# Patient Record
Sex: FEMALE | Race: WHITE | Employment: FULL TIME | URBAN - METROPOLITAN AREA
[De-identification: names, ages, dates, MRNs, and addresses within clinical notes are randomized per-mention and may not be internally consistent; named-entity substitution may affect disease eponyms.]

---

## 2022-12-20 ENCOUNTER — OFFICE VISIT (OUTPATIENT)
Dept: FAMILY MEDICINE CLINIC | Facility: CLINIC | Age: 47
End: 2022-12-20
Payer: COMMERCIAL

## 2022-12-20 ENCOUNTER — LAB ENCOUNTER (OUTPATIENT)
Dept: LAB | Age: 47
End: 2022-12-20
Attending: FAMILY MEDICINE
Payer: COMMERCIAL

## 2022-12-20 VITALS
BODY MASS INDEX: 25.97 KG/M2 | WEIGHT: 154 LBS | RESPIRATION RATE: 16 BRPM | OXYGEN SATURATION: 98 % | SYSTOLIC BLOOD PRESSURE: 104 MMHG | HEART RATE: 68 BPM | HEIGHT: 64.5 IN | DIASTOLIC BLOOD PRESSURE: 64 MMHG

## 2022-12-20 DIAGNOSIS — Z00.00 ANNUAL PHYSICAL EXAM: Primary | ICD-10-CM

## 2022-12-20 DIAGNOSIS — Z00.00 ANNUAL PHYSICAL EXAM: ICD-10-CM

## 2022-12-20 DIAGNOSIS — F41.9 ANXIETY AND DEPRESSION: ICD-10-CM

## 2022-12-20 DIAGNOSIS — Z12.11 COLON CANCER SCREENING: ICD-10-CM

## 2022-12-20 DIAGNOSIS — F32.A ANXIETY AND DEPRESSION: ICD-10-CM

## 2022-12-20 DIAGNOSIS — Z12.31 ENCOUNTER FOR SCREENING MAMMOGRAM FOR HIGH-RISK PATIENT: ICD-10-CM

## 2022-12-20 DIAGNOSIS — F32.81 PMDD (PREMENSTRUAL DYSPHORIC DISORDER): ICD-10-CM

## 2022-12-20 LAB
ALBUMIN SERPL-MCNC: 4.1 G/DL (ref 3.4–5)
ALBUMIN/GLOB SERPL: 1.1 {RATIO} (ref 1–2)
ALP LIVER SERPL-CCNC: 32 U/L
ALT SERPL-CCNC: 20 U/L
ANION GAP SERPL CALC-SCNC: 5 MMOL/L (ref 0–18)
AST SERPL-CCNC: 18 U/L (ref 15–37)
BASOPHILS # BLD AUTO: 0.07 X10(3) UL (ref 0–0.2)
BASOPHILS NFR BLD AUTO: 1.5 %
BILIRUB SERPL-MCNC: 0.5 MG/DL (ref 0.1–2)
BUN BLD-MCNC: 9 MG/DL (ref 7–18)
CALCIUM BLD-MCNC: 9.4 MG/DL (ref 8.5–10.1)
CHLORIDE SERPL-SCNC: 105 MMOL/L (ref 98–112)
CHOLEST SERPL-MCNC: 221 MG/DL (ref ?–200)
CO2 SERPL-SCNC: 28 MMOL/L (ref 21–32)
CREAT BLD-MCNC: 0.71 MG/DL
EOSINOPHIL # BLD AUTO: 0.05 X10(3) UL (ref 0–0.7)
EOSINOPHIL NFR BLD AUTO: 1.1 %
ERYTHROCYTE [DISTWIDTH] IN BLOOD BY AUTOMATED COUNT: 13 %
ESTRADIOL SERPL-MCNC: 72.4 PG/ML
FASTING PATIENT LIPID ANSWER: YES
FASTING STATUS PATIENT QL REPORTED: YES
GFR SERPLBLD BASED ON 1.73 SQ M-ARVRAT: 107 ML/MIN/1.73M2 (ref 60–?)
GLOBULIN PLAS-MCNC: 3.6 G/DL (ref 2.8–4.4)
GLUCOSE BLD-MCNC: 86 MG/DL (ref 70–99)
HCT VFR BLD AUTO: 41.2 %
HDLC SERPL-MCNC: 78 MG/DL (ref 40–59)
HGB BLD-MCNC: 13.3 G/DL
IGA SERPL-MCNC: 298 MG/DL (ref 70–312)
IMM GRANULOCYTES # BLD AUTO: 0 X10(3) UL (ref 0–1)
IMM GRANULOCYTES NFR BLD: 0 %
LDLC SERPL CALC-MCNC: 137 MG/DL (ref ?–100)
LYMPHOCYTES # BLD AUTO: 1.95 X10(3) UL (ref 1–4)
LYMPHOCYTES NFR BLD AUTO: 41 %
MCH RBC QN AUTO: 31.1 PG (ref 26–34)
MCHC RBC AUTO-ENTMCNC: 32.3 G/DL (ref 31–37)
MCV RBC AUTO: 96.3 FL
MONOCYTES # BLD AUTO: 0.43 X10(3) UL (ref 0.1–1)
MONOCYTES NFR BLD AUTO: 9 %
NEUTROPHILS # BLD AUTO: 2.26 X10 (3) UL (ref 1.5–7.7)
NEUTROPHILS # BLD AUTO: 2.26 X10(3) UL (ref 1.5–7.7)
NEUTROPHILS NFR BLD AUTO: 47.4 %
NONHDLC SERPL-MCNC: 143 MG/DL (ref ?–130)
OSMOLALITY SERPL CALC.SUM OF ELEC: 284 MOSM/KG (ref 275–295)
PLATELET # BLD AUTO: 311 10(3)UL (ref 150–450)
POTASSIUM SERPL-SCNC: 4.3 MMOL/L (ref 3.5–5.1)
PROGEST SERPL-MCNC: 0.72 NG/ML
PROT SERPL-MCNC: 7.7 G/DL (ref 6.4–8.2)
RBC # BLD AUTO: 4.28 X10(6)UL
SODIUM SERPL-SCNC: 138 MMOL/L (ref 136–145)
T4 FREE SERPL-MCNC: 1 NG/DL (ref 0.8–1.7)
TRIGL SERPL-MCNC: 34 MG/DL (ref 30–149)
TSI SER-ACNC: 2.99 MIU/ML (ref 0.36–3.74)
VIT B12 SERPL-MCNC: 604 PG/ML (ref 193–986)
VIT D+METAB SERPL-MCNC: 26.7 NG/ML (ref 30–100)
VLDLC SERPL CALC-MCNC: 6 MG/DL (ref 0–30)
WBC # BLD AUTO: 4.8 X10(3) UL (ref 4–11)

## 2022-12-20 PROCEDURE — 84403 ASSAY OF TOTAL TESTOSTERONE: CPT

## 2022-12-20 PROCEDURE — 86364 TISS TRNSGLTMNASE EA IG CLAS: CPT

## 2022-12-20 PROCEDURE — 3008F BODY MASS INDEX DOCD: CPT | Performed by: FAMILY MEDICINE

## 2022-12-20 PROCEDURE — 84439 ASSAY OF FREE THYROXINE: CPT

## 2022-12-20 PROCEDURE — 82670 ASSAY OF TOTAL ESTRADIOL: CPT

## 2022-12-20 PROCEDURE — 3074F SYST BP LT 130 MM HG: CPT | Performed by: FAMILY MEDICINE

## 2022-12-20 PROCEDURE — 80061 LIPID PANEL: CPT

## 2022-12-20 PROCEDURE — 85025 COMPLETE CBC W/AUTO DIFF WBC: CPT

## 2022-12-20 PROCEDURE — 81382 HLA II TYPING 1 LOC HR: CPT

## 2022-12-20 PROCEDURE — 84402 ASSAY OF FREE TESTOSTERONE: CPT

## 2022-12-20 PROCEDURE — 80053 COMPREHEN METABOLIC PANEL: CPT

## 2022-12-20 PROCEDURE — 84443 ASSAY THYROID STIM HORMONE: CPT

## 2022-12-20 PROCEDURE — 84144 ASSAY OF PROGESTERONE: CPT

## 2022-12-20 PROCEDURE — 36415 COLL VENOUS BLD VENIPUNCTURE: CPT

## 2022-12-20 PROCEDURE — 82306 VITAMIN D 25 HYDROXY: CPT

## 2022-12-20 PROCEDURE — 82607 VITAMIN B-12: CPT

## 2022-12-20 PROCEDURE — 99386 PREV VISIT NEW AGE 40-64: CPT | Performed by: FAMILY MEDICINE

## 2022-12-20 PROCEDURE — 3078F DIAST BP <80 MM HG: CPT | Performed by: FAMILY MEDICINE

## 2022-12-20 PROCEDURE — 99213 OFFICE O/P EST LOW 20 MIN: CPT | Performed by: FAMILY MEDICINE

## 2022-12-23 DIAGNOSIS — E78.00 ELEVATED CHOLESTEROL: Primary | ICD-10-CM

## 2022-12-23 DIAGNOSIS — E55.9 VITAMIN D DEFICIENCY: ICD-10-CM

## 2022-12-23 LAB — TTG IGA SER-ACNC: 0.6 U/ML (ref ?–7)

## 2022-12-28 LAB
SEX HORMONE BINDING GLOBULIN: 64 NMOL/L
TESTOSTERONE -MS, BIOAVAILAB: 17.1 NG/DL
TESTOSTERONE, -MS/MS: 54 NG/DL
TESTOSTERONE, FREE -MS/MS: 5.9 PG/ML

## 2023-01-03 ENCOUNTER — PATIENT MESSAGE (OUTPATIENT)
Dept: FAMILY MEDICINE CLINIC | Facility: CLINIC | Age: 48
End: 2023-01-03

## 2023-01-03 NOTE — TELEPHONE ENCOUNTER
From: Janis Bobo  To: Konrad Martino DO  Sent: 1/3/2023 7:04 AM CST  Subject: Referral for chiropractor    I just saw Dr Leonie Tolentino and forgot to get a referral for a chiropractor. Sam never seen one before but would like an evaluation due to chronic neck pain. Please tell me next steps, thank you.  Nina Daley

## 2023-01-04 ENCOUNTER — TELEPHONE (OUTPATIENT)
Dept: ORTHOPEDICS CLINIC | Facility: CLINIC | Age: 48
End: 2023-01-04

## 2023-01-04 DIAGNOSIS — M54.50 LOW BACK PAIN, UNSPECIFIED BACK PAIN LATERALITY, UNSPECIFIED CHRONICITY, UNSPECIFIED WHETHER SCIATICA PRESENT: ICD-10-CM

## 2023-01-04 DIAGNOSIS — M54.2 NECK PAIN: Primary | ICD-10-CM

## 2023-01-04 NOTE — TELEPHONE ENCOUNTER
NP Neck and lower back pain. Please advise if imaging is needed.   Future Appointments   Date Time Provider Kalie Marge   1/10/2023 10:00 AM Srinivasan Downey MD [facility]THO [facility] [id number]   1/25/2023  9:00 AM [facility] RM1 PFS MAMMO [facility]   5/9/2023  2:30 PM Salazar Atkinson MD [facility] OB[facility]GYN P [facility] [address]

## 2023-01-10 ENCOUNTER — OFFICE VISIT (OUTPATIENT)
Dept: ORTHOPEDICS CLINIC | Facility: CLINIC | Age: 48
End: 2023-01-10
Payer: COMMERCIAL

## 2023-01-10 ENCOUNTER — HOSPITAL ENCOUNTER (OUTPATIENT)
Dept: GENERAL RADIOLOGY | Age: 48
Discharge: HOME | End: 2023-01-10
Attending: STUDENT IN AN ORGANIZED HEALTH CARE EDUCATION/TRAINING PROGRAM
Payer: COMMERCIAL

## 2023-01-10 VITALS — WEIGHT: 152 LBS | BODY MASS INDEX: 25.64 KG/M2 | HEIGHT: 64.5 IN

## 2023-01-10 DIAGNOSIS — M54.2 NECK PAIN: ICD-10-CM

## 2023-01-10 DIAGNOSIS — G89.29 CHRONIC MIDLINE LOW BACK PAIN WITHOUT SCIATICA: Primary | ICD-10-CM

## 2023-01-10 DIAGNOSIS — M54.50 LOW BACK PAIN, UNSPECIFIED BACK PAIN LATERALITY, UNSPECIFIED CHRONICITY, UNSPECIFIED WHETHER SCIATICA PRESENT: ICD-10-CM

## 2023-01-10 DIAGNOSIS — M54.50 CHRONIC MIDLINE LOW BACK PAIN WITHOUT SCIATICA: Primary | ICD-10-CM

## 2023-01-10 PROCEDURE — 72100 X-RAY EXAM L-S SPINE 2/3 VWS: CPT | Performed by: STUDENT IN AN ORGANIZED HEALTH CARE EDUCATION/TRAINING PROGRAM

## 2023-01-10 PROCEDURE — 72050 X-RAY EXAM NECK SPINE 4/5VWS: CPT | Performed by: STUDENT IN AN ORGANIZED HEALTH CARE EDUCATION/TRAINING PROGRAM

## 2023-01-10 PROCEDURE — 3008F BODY MASS INDEX DOCD: CPT | Performed by: STUDENT IN AN ORGANIZED HEALTH CARE EDUCATION/TRAINING PROGRAM

## 2023-01-10 PROCEDURE — 99204 OFFICE O/P NEW MOD 45 MIN: CPT | Performed by: STUDENT IN AN ORGANIZED HEALTH CARE EDUCATION/TRAINING PROGRAM

## 2023-01-25 ENCOUNTER — HOSPITAL ENCOUNTER (OUTPATIENT)
Dept: MAMMOGRAPHY | Age: 48
Discharge: HOME | End: 2023-01-25
Attending: FAMILY MEDICINE
Payer: COMMERCIAL

## 2023-01-25 DIAGNOSIS — Z12.31 ENCOUNTER FOR SCREENING MAMMOGRAM FOR HIGH-RISK PATIENT: ICD-10-CM

## 2023-01-25 PROCEDURE — 77063 BREAST TOMOSYNTHESIS BI: CPT | Performed by: FAMILY MEDICINE

## 2023-01-25 PROCEDURE — 77067 SCR MAMMO BI INCL CAD: CPT | Performed by: FAMILY MEDICINE

## 2023-01-26 DIAGNOSIS — R92.2 DENSE BREAST: Primary | ICD-10-CM

## 2023-03-02 ENCOUNTER — PATIENT MESSAGE (OUTPATIENT)
Dept: FAMILY MEDICINE CLINIC | Facility: CLINIC | Age: 48
End: 2023-03-02

## 2023-03-28 ENCOUNTER — OFFICE VISIT (OUTPATIENT)
Dept: OBGYN CLINIC | Facility: CLINIC | Age: 48
End: 2023-03-28

## 2023-03-28 VITALS
HEIGHT: 64.5 IN | WEIGHT: 155.31 LBS | DIASTOLIC BLOOD PRESSURE: 76 MMHG | BODY MASS INDEX: 26.19 KG/M2 | SYSTOLIC BLOOD PRESSURE: 118 MMHG

## 2023-03-28 DIAGNOSIS — N92.6 IRREGULAR MENSES: Primary | ICD-10-CM

## 2023-03-28 PROCEDURE — 3078F DIAST BP <80 MM HG: CPT | Performed by: OBSTETRICS & GYNECOLOGY

## 2023-03-28 PROCEDURE — 3074F SYST BP LT 130 MM HG: CPT | Performed by: OBSTETRICS & GYNECOLOGY

## 2023-03-28 PROCEDURE — 3008F BODY MASS INDEX DOCD: CPT | Performed by: OBSTETRICS & GYNECOLOGY

## 2023-03-28 PROCEDURE — 99204 OFFICE O/P NEW MOD 45 MIN: CPT | Performed by: OBSTETRICS & GYNECOLOGY

## 2023-04-10 ENCOUNTER — LAB ENCOUNTER (OUTPATIENT)
Dept: LAB | Age: 48
End: 2023-04-10
Attending: OBSTETRICS & GYNECOLOGY
Payer: COMMERCIAL

## 2023-04-10 DIAGNOSIS — E55.9 VITAMIN D DEFICIENCY: ICD-10-CM

## 2023-04-10 DIAGNOSIS — N92.6 IRREGULAR MENSES: ICD-10-CM

## 2023-04-10 DIAGNOSIS — E78.00 ELEVATED CHOLESTEROL: ICD-10-CM

## 2023-04-10 LAB
ALBUMIN SERPL-MCNC: 3.9 G/DL (ref 3.4–5)
ALBUMIN/GLOB SERPL: 1.1 {RATIO} (ref 1–2)
ALP LIVER SERPL-CCNC: 27 U/L
ALT SERPL-CCNC: 24 U/L
ANION GAP SERPL CALC-SCNC: 6 MMOL/L (ref 0–18)
AST SERPL-CCNC: 18 U/L (ref 15–37)
BILIRUB SERPL-MCNC: 0.5 MG/DL (ref 0.1–2)
BUN BLD-MCNC: 10 MG/DL (ref 7–18)
CALCIUM BLD-MCNC: 9 MG/DL (ref 8.5–10.1)
CHLORIDE SERPL-SCNC: 107 MMOL/L (ref 98–112)
CHOLEST SERPL-MCNC: 198 MG/DL (ref ?–200)
CO2 SERPL-SCNC: 27 MMOL/L (ref 21–32)
CREAT BLD-MCNC: 0.79 MG/DL
DHEA-S SERPL-MCNC: 108.3 UG/DL
EST. AVERAGE GLUCOSE BLD GHB EST-MCNC: 108 MG/DL (ref 68–126)
ESTRADIOL SERPL-MCNC: 61.5 PG/ML
FASTING PATIENT LIPID ANSWER: YES
FASTING STATUS PATIENT QL REPORTED: YES
FSH SERPL-ACNC: 3.7 MIU/ML
GFR SERPLBLD BASED ON 1.73 SQ M-ARVRAT: 94 ML/MIN/1.73M2 (ref 60–?)
GLOBULIN PLAS-MCNC: 3.7 G/DL (ref 2.8–4.4)
GLUCOSE BLD-MCNC: 88 MG/DL (ref 70–99)
HBA1C MFR BLD: 5.4 % (ref ?–5.7)
HDLC SERPL-MCNC: 69 MG/DL (ref 40–59)
INSULIN SERPL-ACNC: 6.4 MU/L (ref 3–25)
LDLC SERPL CALC-MCNC: 121 MG/DL (ref ?–100)
LH SERPL-ACNC: 3.1 MIU/ML
NONHDLC SERPL-MCNC: 129 MG/DL (ref ?–130)
OSMOLALITY SERPL CALC.SUM OF ELEC: 288 MOSM/KG (ref 275–295)
POTASSIUM SERPL-SCNC: 4 MMOL/L (ref 3.5–5.1)
PROLACTIN SERPL-MCNC: 9.5 NG/ML
PROT SERPL-MCNC: 7.6 G/DL (ref 6.4–8.2)
SODIUM SERPL-SCNC: 140 MMOL/L (ref 136–145)
TRIGL SERPL-MCNC: 43 MG/DL (ref 30–149)
VLDLC SERPL CALC-MCNC: 8 MG/DL (ref 0–30)

## 2023-04-10 PROCEDURE — 80061 LIPID PANEL: CPT

## 2023-04-10 PROCEDURE — 83525 ASSAY OF INSULIN: CPT

## 2023-04-10 PROCEDURE — 83001 ASSAY OF GONADOTROPIN (FSH): CPT

## 2023-04-10 PROCEDURE — 82627 DEHYDROEPIANDROSTERONE: CPT

## 2023-04-10 PROCEDURE — 84146 ASSAY OF PROLACTIN: CPT

## 2023-04-10 PROCEDURE — 80053 COMPREHEN METABOLIC PANEL: CPT

## 2023-04-10 PROCEDURE — 36415 COLL VENOUS BLD VENIPUNCTURE: CPT

## 2023-04-10 PROCEDURE — 82670 ASSAY OF TOTAL ESTRADIOL: CPT

## 2023-04-10 PROCEDURE — 82306 VITAMIN D 25 HYDROXY: CPT

## 2023-04-10 PROCEDURE — 83036 HEMOGLOBIN GLYCOSYLATED A1C: CPT

## 2023-04-10 PROCEDURE — 84410 TESTOSTERONE BIOAVAILABLE: CPT

## 2023-04-10 PROCEDURE — 83002 ASSAY OF GONADOTROPIN (LH): CPT

## 2023-04-11 ENCOUNTER — PATIENT MESSAGE (OUTPATIENT)
Dept: FAMILY MEDICINE CLINIC | Facility: CLINIC | Age: 48
End: 2023-04-11

## 2023-04-11 LAB — VIT D+METAB SERPL-MCNC: 66 NG/ML (ref 30–100)

## 2023-04-13 ENCOUNTER — TELEPHONE (OUTPATIENT)
Dept: OBGYN CLINIC | Facility: CLINIC | Age: 48
End: 2023-04-13

## 2023-04-18 LAB
SEX HORM BIND GLOB: 54.5 NMOL/L
TESTOST % FREE+WEAK BND: 9.7 %
TESTOST FREE+WEAK BND: 2.4 NG/DL
TESTOSTERONE TOT /MS: 24.6 NG/DL

## 2023-04-25 ENCOUNTER — OFFICE VISIT (OUTPATIENT)
Dept: OBGYN CLINIC | Facility: CLINIC | Age: 48
End: 2023-04-25

## 2023-04-25 VITALS — HEIGHT: 64.5 IN | BODY MASS INDEX: 26.55 KG/M2 | WEIGHT: 157.44 LBS

## 2023-04-25 DIAGNOSIS — N95.1 PERIMENOPAUSE: Primary | ICD-10-CM

## 2023-04-25 RX ORDER — DROSPIRENONE AND ETHINYL ESTRADIOL 0.02-3(28)
1 KIT ORAL DAILY
Qty: 84 TABLET | Refills: 1 | Status: SHIPPED | OUTPATIENT
Start: 2023-04-25 | End: 2024-04-24

## 2023-05-15 ENCOUNTER — HOSPITAL ENCOUNTER (OUTPATIENT)
Dept: ULTRASOUND IMAGING | Age: 48
Discharge: HOME | End: 2023-05-15
Attending: FAMILY MEDICINE
Payer: COMMERCIAL

## 2023-05-15 DIAGNOSIS — R92.2 DENSE BREAST: ICD-10-CM

## 2023-05-15 PROCEDURE — 76641 ULTRASOUND BREAST COMPLETE: CPT | Performed by: FAMILY MEDICINE

## 2023-10-13 RX ORDER — DROSPIRENONE AND ETHINYL ESTRADIOL 0.02-3(28)
1 KIT ORAL DAILY
Qty: 84 TABLET | Refills: 1 | OUTPATIENT
Start: 2023-10-13 | End: 2024-10-12

## 2023-10-24 ENCOUNTER — OFFICE VISIT (OUTPATIENT)
Dept: OBGYN CLINIC | Facility: CLINIC | Age: 48
End: 2023-10-24

## 2023-10-24 VITALS
HEIGHT: 64.5 IN | SYSTOLIC BLOOD PRESSURE: 102 MMHG | BODY MASS INDEX: 26.56 KG/M2 | DIASTOLIC BLOOD PRESSURE: 68 MMHG | WEIGHT: 157.5 LBS

## 2023-10-24 DIAGNOSIS — N95.1 PERIMENOPAUSE: ICD-10-CM

## 2023-10-24 DIAGNOSIS — Z28.21 INFLUENZA VACCINATION DECLINED BY PATIENT: Primary | ICD-10-CM

## 2023-10-24 PROCEDURE — 3008F BODY MASS INDEX DOCD: CPT | Performed by: OBSTETRICS & GYNECOLOGY

## 2023-10-24 PROCEDURE — 3074F SYST BP LT 130 MM HG: CPT | Performed by: OBSTETRICS & GYNECOLOGY

## 2023-10-24 PROCEDURE — 99213 OFFICE O/P EST LOW 20 MIN: CPT | Performed by: OBSTETRICS & GYNECOLOGY

## 2023-10-24 PROCEDURE — 3078F DIAST BP <80 MM HG: CPT | Performed by: OBSTETRICS & GYNECOLOGY

## 2023-10-24 RX ORDER — DROSPIRENONE AND ETHINYL ESTRADIOL 0.03MG-3MG
1 KIT ORAL DAILY
Qty: 84 TABLET | Refills: 4 | Status: SHIPPED | OUTPATIENT
Start: 2023-10-24

## 2023-11-15 ENCOUNTER — PATIENT MESSAGE (OUTPATIENT)
Dept: OBGYN CLINIC | Facility: CLINIC | Age: 48
End: 2023-11-15

## 2023-11-17 RX ORDER — DROSPIRENONE AND ETHINYL ESTRADIOL 0.02-3(28)
1 KIT ORAL DAILY
Qty: 84 TABLET | Refills: 1 | Status: SHIPPED | OUTPATIENT
Start: 2023-11-17 | End: 2024-11-16

## 2023-11-17 RX ORDER — DROSPIRENONE AND ETHINYL ESTRADIOL 0.03MG-3MG
1 KIT ORAL DAILY
Qty: 84 TABLET | Refills: 4 | Status: SHIPPED | OUTPATIENT
Start: 2023-11-17 | End: 2023-11-17 | Stop reason: CLARIF

## 2023-12-30 ENCOUNTER — E-VISIT (OUTPATIENT)
Dept: TELEHEALTH | Age: 48
End: 2023-12-30
Payer: COMMERCIAL

## 2023-12-30 DIAGNOSIS — R68.89 FLU-LIKE SYMPTOMS: Primary | ICD-10-CM

## 2023-12-30 PROCEDURE — 99422 OL DIG E/M SVC 11-20 MIN: CPT | Performed by: NURSE PRACTITIONER

## 2023-12-30 RX ORDER — OSELTAMIVIR PHOSPHATE 75 MG/1
75 CAPSULE ORAL 2 TIMES DAILY
Qty: 10 CAPSULE | Refills: 0 | Status: SHIPPED | OUTPATIENT
Start: 2023-12-30 | End: 2024-01-04

## 2024-01-02 NOTE — PROGRESS NOTES
Refer to patient Questionnaire and responses.  See MyChart messages.    15 minutes spent in direct communication with patient via E-visit.

## 2024-01-14 ENCOUNTER — PATIENT MESSAGE (OUTPATIENT)
Dept: OBGYN CLINIC | Facility: CLINIC | Age: 49
End: 2024-01-14

## 2024-01-15 RX ORDER — METRONIDAZOLE 500 MG/1
500 TABLET ORAL 2 TIMES DAILY
Qty: 14 TABLET | Refills: 0 | Status: SHIPPED | OUTPATIENT
Start: 2024-01-15

## 2024-01-15 NOTE — TELEPHONE ENCOUNTER
From: Carol Krueger  To: Jaye Mcmahan  Sent: 1/14/2024 8:05 PM CST  Subject: Bacterial infection    Hello. I’m reaching out to get an antibiotic for a bacterial vaginosis infection. I’ve had an awful odor the past 4 months. It comes and goes, but is very prevalent after sex. I was very intentional about bathing more but it didn’t help. I took an at-home vaginal ph test several times and it’s a 5.0, which indicates abnormal and matches their description of a bacterial vaginosis infection. I have taken a few boric acid suppositories and that seems to help with the odor (and the ph goes to 4.5) until the next time I have sex. I only have an odor… no pain, no itching, no burning. After 4 months of this, I think it’s just never been gone.     In addition, I’ve been bleeding the past 3 weeks. The month before was for 2 weeks. I’m on birth control, but the bleeding happens while I’m taking the pills (not during the placebo pills). Dr Mcmahan already said that is breakthrough bleeding that tends to happen on the pill, so I’m not overly concerned. I just wanted to mention it.    I’m scheduled for a physical with Dr Mcmahan in March that I will attend, but if I can please get an antibiotic for this infection/odor, I’d really appreciate it. Thanks Carol

## 2024-03-29 ENCOUNTER — OFFICE VISIT (OUTPATIENT)
Dept: OBGYN CLINIC | Facility: CLINIC | Age: 49
End: 2024-03-29
Payer: COMMERCIAL

## 2024-03-29 VITALS
HEIGHT: 64 IN | DIASTOLIC BLOOD PRESSURE: 70 MMHG | SYSTOLIC BLOOD PRESSURE: 106 MMHG | HEART RATE: 74 BPM | WEIGHT: 154.5 LBS | BODY MASS INDEX: 26.38 KG/M2

## 2024-03-29 DIAGNOSIS — Z12.4 CERVICAL CANCER SCREENING: ICD-10-CM

## 2024-03-29 DIAGNOSIS — Z12.31 ENCOUNTER FOR SCREENING MAMMOGRAM FOR MALIGNANT NEOPLASM OF BREAST: Primary | ICD-10-CM

## 2024-03-29 DIAGNOSIS — L65.9 HAIR LOSS: ICD-10-CM

## 2024-03-29 DIAGNOSIS — Z01.419 WELL WOMAN EXAM WITH ROUTINE GYNECOLOGICAL EXAM: ICD-10-CM

## 2024-03-29 PROCEDURE — 87624 HPV HI-RISK TYP POOLED RSLT: CPT | Performed by: STUDENT IN AN ORGANIZED HEALTH CARE EDUCATION/TRAINING PROGRAM

## 2024-03-29 PROCEDURE — 3078F DIAST BP <80 MM HG: CPT | Performed by: STUDENT IN AN ORGANIZED HEALTH CARE EDUCATION/TRAINING PROGRAM

## 2024-03-29 PROCEDURE — 3074F SYST BP LT 130 MM HG: CPT | Performed by: STUDENT IN AN ORGANIZED HEALTH CARE EDUCATION/TRAINING PROGRAM

## 2024-03-29 PROCEDURE — 88175 CYTOPATH C/V AUTO FLUID REDO: CPT | Performed by: STUDENT IN AN ORGANIZED HEALTH CARE EDUCATION/TRAINING PROGRAM

## 2024-03-29 PROCEDURE — 99386 PREV VISIT NEW AGE 40-64: CPT | Performed by: STUDENT IN AN ORGANIZED HEALTH CARE EDUCATION/TRAINING PROGRAM

## 2024-03-29 PROCEDURE — 3008F BODY MASS INDEX DOCD: CPT | Performed by: STUDENT IN AN ORGANIZED HEALTH CARE EDUCATION/TRAINING PROGRAM

## 2024-03-29 RX ORDER — DROSPIRENONE AND ETHINYL ESTRADIOL 0.02-3(28)
1 KIT ORAL DAILY
Qty: 84 TABLET | Refills: 1 | Status: SHIPPED | OUTPATIENT
Start: 2024-03-29 | End: 2025-03-29

## 2024-03-29 NOTE — PROGRESS NOTES
Annual Exam (Ages 40-49)    Subjective:    This is a 48 year old  presenting for routine annual exam.    Is on Stanley for PMDD. Doing well on it. Does report hair loss.     Review of Systems   Constitutional: Negative.    HENT: Negative.    Respiratory: Negative.    Gastrointestinal: Negative.    Endocrine: Negative.    Genitourinary: Negative.    Musculoskeletal: Negative.    Skin: Negative.    Allergic/Immunologic: Negative.    Neurological: Negative.      Objective:    No Known Allergies  Past Medical History:   Diagnosis Date    Abnormal uterine bleeding     Had endometrial ablation; no issues currently    Anemia     Hx of… not current    Anxiety 2020    Stopped zoloft a couple weeks after i started Stanley because of extreme sadness    Depression PMDD since ’s    Stopped zoloft after starting stanley bc of extreme sadness whole on both at the same time    Hyperlipidemia 2022       Current Outpatient Medications:     Drospirenone-Ethinyl Estradiol (STANLEY) 3-0.02 MG Oral Tab, Take 1 tablet by mouth daily., Disp: 84 tablet, Rfl: 1  Past Surgical History:   Procedure Laterality Date    ENDOMETRIAL ABLATION      IMPLANTABLE BREAST PROSTHESIS  Oct 2015     Family History   Problem Relation Age of Onset    Psychiatric Mother         Bipolar;  by suicide at age 50    Diabetes Father         Type 2    Hypertension Father     Cancer Maternal Grandmother         Pancreatic cancer    Pancreatic Cancer Maternal Grandmother     Heart Disease Paternal Grandfather          at 75 heart attack    Cancer Paternal Grandmother         Breast cancer    Breast Cancer Paternal Grandmother       reports that she has never smoked. She has never used smokeless tobacco. She reports that she does not currently use alcohol. She reports that she does not use drugs.    Physical Exam     Vitals:    24 1303   BP: 106/70   Pulse: 74        Constitutional: She is oriented to person, place, and time. She appears  well-developed and well-nourished.   Cardiovascular: normal peripheral perfusion  Pulmonary/Chest: non labored breathing  Breasts: Examined sitting and supine. No cervical, supraclavicular or axillary adenopathy, no masses, skin changes or nipple discharge.  Abdominal: Soft.   Genitourinary: Normal appearing external genitalia. Vagina is well estrogenized. Normal appearing urethral meatus. Bartholin's gland normal to palpation. Normal appearing cervix. Cervix is not friable and with normal appearing discharge. Uterus is 10 weeks size  and non tender. No cervical motion tenderness. Normal adnexa bilaterally without tenderness.  Neurological: She is alert and oriented to person, place, and time.     Assessment and Plan    This is a 48 year old  presenting for annual exam. Discussed utility of hormone testing. Referral for derm placed.    #Annual Exam  -Depression screening PHQ 0  -Blood pressure screening normal  -Contraception vasectomy  -Body mass index is 26.52 kg/m².   -STD screening declined  -Urinary incontinence screening neg  -Pap smear collected  -Mammogram ordered  -maggi Robertson MD

## 2024-03-29 NOTE — PATIENT INSTRUCTIONS
Darvin Moreira MD  Dermatology  [facility]  [place]  Phone: [phone]    [facility] [phone] for mammogram

## 2024-04-01 LAB — HPV I/H RISK 1 DNA SPEC QL NAA+PROBE: NEGATIVE

## 2024-04-04 LAB
.: NORMAL
.: NORMAL

## 2024-04-08 RX ORDER — DROSPIRENONE AND ETHINYL ESTRADIOL 0.02-3(28)
1 KIT ORAL DAILY
Qty: 84 TABLET | Refills: 1 | OUTPATIENT
Start: 2024-04-08 | End: 2025-04-08

## 2024-04-26 ENCOUNTER — HOSPITAL ENCOUNTER (OUTPATIENT)
Dept: MAMMOGRAPHY | Age: 49
Discharge: HOME | End: 2024-04-26
Attending: STUDENT IN AN ORGANIZED HEALTH CARE EDUCATION/TRAINING PROGRAM
Payer: COMMERCIAL

## 2024-04-26 DIAGNOSIS — Z12.31 ENCOUNTER FOR SCREENING MAMMOGRAM FOR MALIGNANT NEOPLASM OF BREAST: ICD-10-CM

## 2024-04-26 PROCEDURE — 77067 SCR MAMMO BI INCL CAD: CPT | Performed by: STUDENT IN AN ORGANIZED HEALTH CARE EDUCATION/TRAINING PROGRAM

## 2024-04-26 PROCEDURE — 77063 BREAST TOMOSYNTHESIS BI: CPT | Performed by: STUDENT IN AN ORGANIZED HEALTH CARE EDUCATION/TRAINING PROGRAM

## 2024-08-12 NOTE — PROGRESS NOTES
GYN PROBLEM VISIT    Subjective:   This is a 48 year old  presenting for GYN visit.     Has been on Stanley for several years for PMDD and elevated testosterone (facial hair growth and acne). She reports continued PMDD symptoms which consist of mostly anxiety and irritability the last two weeks of her cycle. This past cycle, she noticed these same symptoms a week earlier than usual. She also reports night sweats while taking the Stanley placebo pills. Stanley has improved her acne and facial hair. However, she does have decreased libido.  She previously took zoloft during the luteal phase of her cycle in her 20s. In , she took Zoloft continuously and had worsening depressive sx. She also previously took Ciarra which also did not work well hormonally for her. She does not want to go back on an SSRI.     Sister is on bioidential hormones.      Review of Systems   Constitutional: as above  HENT: Negative.    Respiratory: Negative.    Gastrointestinal: Negative.    Endocrine: Negative.    Genitourinary: Negative.    Musculoskeletal: Negative.    Skin: Negative.    Allergic/Immunologic: Negative.    Neurological: Negative.      Past Medical History:    Abnormal uterine bleeding    Had endometrial ablation; no issues currently    Anemia    Hx of… not current    Anxiety    Stopped zoloft a couple weeks after i started Stanley because of extreme sadness    Depression    Stopped zoloft after starting stanley bc of extreme sadness whole on both at the same time    Hyperlipidemia       Past Surgical History:   Procedure Laterality Date    Endometrial ablation  2017    Implantable breast prosthesis  Oct 2015       No Known Allergies     Drospirenone-Ethinyl Estradiol (STANLEY) 3-0.02 MG Oral Tab Take 1 tablet by mouth daily. 84 tablet 1         Objective:    Physical Exam     Vitals:    24 1346   BP: 102/60        Constitutional: She is oriented to person, place, and time. She appears well-developed and well-nourished.      Assessment/Plan:  This is a 48 year old  presenting with PMDD. Discussed that Steph should be suppressing ovulation and helping with her PMDD symptoms. Since she is having hypoestrogenic symptoms when taking placebo pills, I do wonder if she is not getting enough estrogen and maybe ovulation is not being suppressed? She asks about coming off the pill or changing pills, but is concerned about her PMDD symptoms worsening and her testosterone increasing. Her options for PMDD include cyclic vs continuous SSRIs, changing OCPs (not keen on given poor reaction to Ciarra), and trying HRT. Her options for elevated testosterone include trying slynd (in addition to HRT) or taking spironolactone. She is undecided. She may also seek a functional medicine doctor.    Total patient time was 30 minutes in evaluation, consultation, and coordination of care. This included face to face and non-face to face actions. The patient's questions and concerns that were addressed.       Dipak Robertson MD

## 2024-08-19 ENCOUNTER — LAB ENCOUNTER (OUTPATIENT)
Dept: LAB | Age: 49
End: 2024-08-19
Attending: FAMILY MEDICINE
Payer: COMMERCIAL

## 2024-08-19 ENCOUNTER — OFFICE VISIT (OUTPATIENT)
Dept: FAMILY MEDICINE CLINIC | Facility: CLINIC | Age: 49
End: 2024-08-19
Payer: COMMERCIAL

## 2024-08-19 VITALS
DIASTOLIC BLOOD PRESSURE: 58 MMHG | SYSTOLIC BLOOD PRESSURE: 96 MMHG | BODY MASS INDEX: 25.78 KG/M2 | HEART RATE: 79 BPM | OXYGEN SATURATION: 99 % | WEIGHT: 151 LBS | RESPIRATION RATE: 16 BRPM | HEIGHT: 64 IN

## 2024-08-19 DIAGNOSIS — Z12.11 COLON CANCER SCREENING: ICD-10-CM

## 2024-08-19 DIAGNOSIS — R53.81 CHRONIC FATIGUE AND MALAISE: ICD-10-CM

## 2024-08-19 DIAGNOSIS — E34.9 HORMONAL DISORDER: ICD-10-CM

## 2024-08-19 DIAGNOSIS — R53.82 CHRONIC FATIGUE AND MALAISE: ICD-10-CM

## 2024-08-19 DIAGNOSIS — Z00.00 ANNUAL PHYSICAL EXAM: Primary | ICD-10-CM

## 2024-08-19 DIAGNOSIS — D22.9 ATYPICAL NEVI: ICD-10-CM

## 2024-08-19 DIAGNOSIS — Z83.79 FAMILY HISTORY OF CELIAC DISEASE: ICD-10-CM

## 2024-08-19 DIAGNOSIS — Z00.00 ANNUAL PHYSICAL EXAM: ICD-10-CM

## 2024-08-19 LAB
ALBUMIN SERPL-MCNC: 4.5 G/DL (ref 3.2–4.8)
ALBUMIN/GLOB SERPL: 1.4 {RATIO} (ref 1–2)
ALP LIVER SERPL-CCNC: 25 U/L
ALT SERPL-CCNC: 22 U/L
ANION GAP SERPL CALC-SCNC: 7 MMOL/L (ref 0–18)
AST SERPL-CCNC: 19 U/L (ref ?–34)
BASOPHILS # BLD AUTO: 0.05 X10(3) UL (ref 0–0.2)
BASOPHILS NFR BLD AUTO: 0.8 %
BILIRUB SERPL-MCNC: 0.3 MG/DL (ref 0.3–1.2)
BUN BLD-MCNC: 14 MG/DL (ref 9–23)
CALCIUM BLD-MCNC: 10 MG/DL (ref 8.7–10.4)
CHLORIDE SERPL-SCNC: 105 MMOL/L (ref 98–112)
CHOLEST SERPL-MCNC: 222 MG/DL (ref ?–200)
CO2 SERPL-SCNC: 26 MMOL/L (ref 21–32)
CREAT BLD-MCNC: 0.8 MG/DL
EGFRCR SERPLBLD CKD-EPI 2021: 92 ML/MIN/1.73M2 (ref 60–?)
EOSINOPHIL # BLD AUTO: 0.07 X10(3) UL (ref 0–0.7)
EOSINOPHIL NFR BLD AUTO: 1.1 %
ERYTHROCYTE [DISTWIDTH] IN BLOOD BY AUTOMATED COUNT: 12.5 %
EST. AVERAGE GLUCOSE BLD GHB EST-MCNC: 120 MG/DL (ref 68–126)
FASTING PATIENT LIPID ANSWER: YES
FASTING STATUS PATIENT QL REPORTED: YES
GLOBULIN PLAS-MCNC: 3.3 G/DL (ref 2–3.5)
GLUCOSE BLD-MCNC: 91 MG/DL (ref 70–99)
HBA1C MFR BLD: 5.8 % (ref ?–5.7)
HCT VFR BLD AUTO: 40.2 %
HDLC SERPL-MCNC: 66 MG/DL (ref 40–59)
HGB BLD-MCNC: 13.4 G/DL
IGA SERPL-MCNC: 259 MG/DL (ref 70–312)
IMM GRANULOCYTES # BLD AUTO: 0.01 X10(3) UL (ref 0–1)
IMM GRANULOCYTES NFR BLD: 0.2 %
LDLC SERPL CALC-MCNC: 139 MG/DL (ref ?–100)
LYMPHOCYTES # BLD AUTO: 2.07 X10(3) UL (ref 1–4)
LYMPHOCYTES NFR BLD AUTO: 31.6 %
MAGNESIUM SERPL-MCNC: 2 MG/DL (ref 1.6–2.6)
MCH RBC QN AUTO: 30.8 PG (ref 26–34)
MCHC RBC AUTO-ENTMCNC: 33.3 G/DL (ref 31–37)
MCV RBC AUTO: 92.4 FL
MONOCYTES # BLD AUTO: 0.43 X10(3) UL (ref 0.1–1)
MONOCYTES NFR BLD AUTO: 6.6 %
NEUTROPHILS # BLD AUTO: 3.93 X10 (3) UL (ref 1.5–7.7)
NEUTROPHILS # BLD AUTO: 3.93 X10(3) UL (ref 1.5–7.7)
NEUTROPHILS NFR BLD AUTO: 59.7 %
NONHDLC SERPL-MCNC: 156 MG/DL (ref ?–130)
OSMOLALITY SERPL CALC.SUM OF ELEC: 286 MOSM/KG (ref 275–295)
PLATELET # BLD AUTO: 363 10(3)UL (ref 150–450)
POTASSIUM SERPL-SCNC: 4.3 MMOL/L (ref 3.5–5.1)
PROT SERPL-MCNC: 7.8 G/DL (ref 5.7–8.2)
RBC # BLD AUTO: 4.35 X10(6)UL
SODIUM SERPL-SCNC: 138 MMOL/L (ref 136–145)
T4 FREE SERPL-MCNC: 1.2 NG/DL (ref 0.8–1.7)
TRIGL SERPL-MCNC: 99 MG/DL (ref 30–149)
TSI SER-ACNC: 3.38 MIU/ML (ref 0.55–4.78)
VIT B12 SERPL-MCNC: 1244 PG/ML (ref 211–911)
VIT D+METAB SERPL-MCNC: 202.8 NG/ML (ref 30–100)
VLDLC SERPL CALC-MCNC: 18 MG/DL (ref 0–30)
WBC # BLD AUTO: 6.6 X10(3) UL (ref 4–11)

## 2024-08-19 PROCEDURE — 83036 HEMOGLOBIN GLYCOSYLATED A1C: CPT

## 2024-08-19 PROCEDURE — 82784 ASSAY IGA/IGD/IGG/IGM EACH: CPT

## 2024-08-19 PROCEDURE — 84439 ASSAY OF FREE THYROXINE: CPT

## 2024-08-19 PROCEDURE — 82306 VITAMIN D 25 HYDROXY: CPT

## 2024-08-19 PROCEDURE — 86364 TISS TRNSGLTMNASE EA IG CLAS: CPT

## 2024-08-19 PROCEDURE — 82607 VITAMIN B-12: CPT

## 2024-08-19 PROCEDURE — 85025 COMPLETE CBC W/AUTO DIFF WBC: CPT

## 2024-08-19 PROCEDURE — 80053 COMPREHEN METABOLIC PANEL: CPT

## 2024-08-19 PROCEDURE — 84443 ASSAY THYROID STIM HORMONE: CPT

## 2024-08-19 PROCEDURE — 36415 COLL VENOUS BLD VENIPUNCTURE: CPT

## 2024-08-19 PROCEDURE — 83735 ASSAY OF MAGNESIUM: CPT

## 2024-08-19 PROCEDURE — 80061 LIPID PANEL: CPT

## 2024-08-19 NOTE — PROGRESS NOTES
HPI:   Carol Krueger is a 48 year old female who presents for a complete physical exam.       Wt Readings from Last 6 Encounters:   08/19/24 151 lb (68.5 kg)   08/12/24 151 lb 9.6 oz (68.8 kg)   03/29/24 154 lb 8 oz (70.1 kg)   10/24/23 157 lb 8.3 oz (71.5 kg)   04/25/23 157 lb 6.5 oz (71.4 kg)   03/28/23 155 lb 5 oz (70.5 kg)     Body mass index is 25.92 kg/m².     Cholesterol, Total (mg/dL)   Date Value   04/10/2023 198   12/20/2022 221 (H)     HDL Cholesterol (mg/dL)   Date Value   04/10/2023 69 (H)   12/20/2022 78 (H)     LDL Cholesterol (mg/dL)   Date Value   04/10/2023 121 (H)   12/20/2022 137 (H)     AST (U/L)   Date Value   04/10/2023 18   12/20/2022 18     ALT (U/L)   Date Value   04/10/2023 24   12/20/2022 20     last pap- sees gyne - on OCP was put on for perimenopause and PMDD  Pt has been getting very light menses   all previous paps normal  No vaginal discharge  No bladder dysfunction  Regular  menses  birth control-vasectomy   + performing self breast exams  last mammogram- overdue   No calcium and vit D supplementation  No family history of breast colon or ovarian cancer    Pt stopped zoloft after taking the stanley   Pt c/o fatigue - getting hot flashes  Pt did feel better overall with the stanley        Current Outpatient Medications   Medication Sig Dispense Refill    Drospirenone-Ethinyl Estradiol (STANLEY) 3-0.02 MG Oral Tab Take 1 tablet by mouth daily. 84 tablet 1      Past Medical History:    Abnormal uterine bleeding    Had endometrial ablation; no issues currently    Anemia    Hx of… not current    Anxiety    Stopped zoloft a couple weeks after i started Stanley because of extreme sadness    Depression    Stopped zoloft after starting stanley bc of extreme sadness whole on both at the same time    Hyperlipidemia      Past Surgical History:   Procedure Laterality Date    Endometrial ablation  2017    Implantable breast prosthesis  Oct 2015      Family History   Problem Relation Age of Onset    Psychiatric  Mother         Bipolar;  by suicide at age 50    Diabetes Father         Type 2    Hypertension Father     Cancer Maternal Grandmother         Pancreatic cancer    Pancreatic Cancer Maternal Grandmother     Cancer Paternal Grandmother         Breast cancer    Breast Cancer Paternal Grandmother 45        estimate    Heart Disease Paternal Grandfather          at 75 heart attack      Social History:   Social History     Socioeconomic History    Marital status:    Tobacco Use    Smoking status: Never    Smokeless tobacco: Never   Vaping Use    Vaping status: Never Used   Substance and Sexual Activity    Alcohol use: Not Currently    Drug use: Never    Sexual activity: Yes     Partners: Male     Birth control/protection: OCP   Other Topics Concern    Caffeine Concern No    Exercise Yes    Seat Belt Yes     Occ:  : . 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   Exercise: yes   Diet: healthy     REVIEW OF SYSTEMS:   GENERAL: feels well otherwise  SKIN: denies any unusual skin lesions  EYES:denies blurred vision or double vision  HEENT: denies nasal congestion, sinus pain or ST  LUNGS: denies shortness of breath with exertion  CARDIOVASCULAR: denies chest pain on exertion  GI: denies abdominal pain,denies heartburn  : denies dysuria, vaginal discharge or itching   MUSCULOSKELETAL: denies back pain  NEURO: denies headaches  PSYCHE: denies depression or anxiety  HEMATOLOGIC: denies hx of anemia  ENDOCRINE: denies thyroid history  ALL/ASTHMA: denies hx of allergy or asthma    EXAM:   BP 96/58   Pulse 79   Resp 16   Ht 5' 4\" (1.626 m)   Wt 151 lb (68.5 kg)   LMP 2024 (Exact Date)   SpO2 99%   BMI 25.92 kg/m²   Body mass index is 25.92 kg/m².   GENERAL: alert and oriented X 3, well developed, well nourished,in no apparent distress  CARDIO: RRR without murmur  LUNGS: clear to auscultation  NECK: supple,no adenopathy,no thyromegaly  HEENT: atraumatic, normocephalic,ears and throat are  clear  EYES:PERRLA, EOMI, normal,conjunctiva are clear  SKIN: norashes,no suspicious lesions  GI: good BS's,no masses, HSM or tenderness  CHEST: no chest tenderness  MUSCULOSKELETAL: back is not tender,FROM of the back  EXTREMITIES: no cyanosis, clubbing or edema  NEURO: cranial nerves are intact,motor and sensory are grossly intact    ASSESSMENT AND PLAN:   Carol Krueger is a 48 year old female who presents with     1. Annual physical exam    - Vitamin D [E]; Future  - Magnesium; Future  - Free T4, (Free Thyroxine); Future  - Assay, Thyroid Stim Hormone; Future  - Lipid Panel; Future  - CBC With Differential With Platelet; Future  - Vitamin B12; Future  - Comp Metabolic Panel (14); Future  - Hemoglobin A1C; Future  - CELIAC DISEASE SCREEN Reflex [E]; Future    2. Family history of celiac disease    - Gastro Referral - In Network    3. Colon cancer screening    - Gastro Referral - In Network    4. Chronic fatigue and malaise    - [facility]TEGRATIVE MEDICINE PHYSICIAN CONSULTATION ([facility]) - INTERNAL REF    5. Hormonal disorder    - INTEGRATIVE MEDICINE PHYSICIAN CONSULTATION ([facility]) - INTERNAL REF    6. Atypical nevi    - Derm Referral - In Network      Discussed diet, exercise,calcium, vitamin D, fish oil and self breast exams.   Questions answered and patient indicates understanding of these issues and agrees to the plan.  Follow up in 1 year or sooner if needed .

## 2024-08-20 LAB — TTG IGA SER-ACNC: 0.5 U/ML (ref ?–7)

## 2024-08-30 RX ORDER — ESTRADIOL AND LEVONORGESTREL .045; .015 MG/D; MG/D
1 PATCH TRANSDERMAL WEEKLY
Qty: 4 PATCH | Refills: 13 | Status: SHIPPED | OUTPATIENT
Start: 2024-08-30

## 2024-10-03 PROBLEM — D12.3 BENIGN NEOPLASM OF TRANSVERSE COLON: Status: ACTIVE | Noted: 2024-10-03

## 2024-10-03 PROBLEM — Z12.11 SPECIAL SCREENING FOR MALIGNANT NEOPLASM OF COLON: Status: ACTIVE | Noted: 2024-10-03

## 2024-10-03 PROCEDURE — 88305 TISSUE EXAM BY PATHOLOGIST: CPT | Performed by: INTERNAL MEDICINE

## 2024-10-10 ENCOUNTER — LAB REQUISITION (OUTPATIENT)
Dept: LAB | Facility: HOSPITAL | Age: 49
End: 2024-10-10
Payer: COMMERCIAL

## 2024-10-10 DIAGNOSIS — D48.2 NEOPLASM OF UNCERTAIN BEHAVIOR OF PERIPHERAL NERVES AND AUTONOMIC NERVOUS SYSTEM: ICD-10-CM

## 2024-10-10 PROCEDURE — 88305 TISSUE EXAM BY PATHOLOGIST: CPT | Performed by: PHYSICIAN ASSISTANT

## 2024-11-26 ENCOUNTER — TELEPHONE (OUTPATIENT)
Dept: OBGYN CLINIC | Facility: CLINIC | Age: 49
End: 2024-11-26

## 2024-11-26 ENCOUNTER — PATIENT MESSAGE (OUTPATIENT)
Dept: OBGYN CLINIC | Facility: CLINIC | Age: 49
End: 2024-11-26

## 2024-11-27 RX ORDER — FLUCONAZOLE 150 MG/1
TABLET ORAL
Qty: 2 TABLET | Refills: 0 | Status: SHIPPED | OUTPATIENT
Start: 2024-11-27

## 2024-11-27 NOTE — TELEPHONE ENCOUNTER
Pt. Last seen in office for gyn problem and 3/9/ 24 for WWE with Dr. Robertson.  Called and spoke with pt.  She is having symptoms of a yeast infection...burning, irritation, and white clumpy discharge.  Asking for a rx to be treated .  I have pended rx if you can sign. Thanks

## 2024-12-02 ENCOUNTER — IMMUNIZATION (OUTPATIENT)
Dept: FAMILY MEDICINE CLINIC | Facility: CLINIC | Age: 49
End: 2024-12-02
Payer: COMMERCIAL

## 2024-12-02 ENCOUNTER — TELEPHONE (OUTPATIENT)
Dept: OBGYN CLINIC | Facility: CLINIC | Age: 49
End: 2024-12-02

## 2024-12-02 DIAGNOSIS — Z23 NEED FOR INFLUENZA VACCINATION: Primary | ICD-10-CM

## 2024-12-02 PROCEDURE — 90656 IIV3 VACC NO PRSV 0.5 ML IM: CPT | Performed by: NURSE PRACTITIONER

## 2024-12-02 PROCEDURE — 90471 IMMUNIZATION ADMIN: CPT | Performed by: NURSE PRACTITIONER

## 2024-12-02 NOTE — TELEPHONE ENCOUNTER
Pt scheduled, however wants to address if she should still be taking the boric acid     Future Appointments   Date Time Provider Department Center   12/3/2024  5:00 PM Paulina Frias, DO [facility] P [facility] [address]

## 2024-12-02 NOTE — TELEPHONE ENCOUNTER
Pt calling for advice on vaginal odor , she was recently treated for yeast infection , she is using boric acid currently , wondering if she needs to be seen

## 2024-12-02 NOTE — TELEPHONE ENCOUNTER
I spoke with pt, OK to hold Boric Acid until she sees Dr. Frias 12/3 & discuss with her. Pt expresses understanding.

## 2024-12-02 NOTE — TELEPHONE ENCOUNTER
PSR: Please call patient to schedule office visit per Keisha Ley MD recommendation as noted below.  Gonzalez Anderson  Signed 9:06 AM        Pt calling for advice on vaginal odor , she was recently treated for yeast infection , she is using boric acid currently , wondering if she needs to be seen        Keisha Ley MD to Deisy Kelley RN     11/27/24  5:19 PM  Diflucan rx signed, thank you.    If she contacts the office in the next 72 hours with persistent symptoms after taking both doses of the medication, have her schedule a follow up appointment for vaginosis and GCCT swabs (along with Urine culture).    --Keisha

## 2024-12-03 ENCOUNTER — OFFICE VISIT (OUTPATIENT)
Dept: OBGYN CLINIC | Facility: CLINIC | Age: 49
End: 2024-12-03
Payer: COMMERCIAL

## 2024-12-03 VITALS
DIASTOLIC BLOOD PRESSURE: 78 MMHG | HEIGHT: 64 IN | WEIGHT: 156 LBS | BODY MASS INDEX: 26.63 KG/M2 | SYSTOLIC BLOOD PRESSURE: 108 MMHG | HEART RATE: 74 BPM

## 2024-12-03 DIAGNOSIS — N76.0 VAGINITIS AND VULVOVAGINITIS: Primary | ICD-10-CM

## 2024-12-03 PROCEDURE — 99213 OFFICE O/P EST LOW 20 MIN: CPT | Performed by: STUDENT IN AN ORGANIZED HEALTH CARE EDUCATION/TRAINING PROGRAM

## 2024-12-03 PROCEDURE — 81514 NFCT DS BV&VAGINITIS DNA ALG: CPT | Performed by: STUDENT IN AN ORGANIZED HEALTH CARE EDUCATION/TRAINING PROGRAM

## 2024-12-03 PROCEDURE — 3074F SYST BP LT 130 MM HG: CPT | Performed by: STUDENT IN AN ORGANIZED HEALTH CARE EDUCATION/TRAINING PROGRAM

## 2024-12-03 PROCEDURE — 3078F DIAST BP <80 MM HG: CPT | Performed by: STUDENT IN AN ORGANIZED HEALTH CARE EDUCATION/TRAINING PROGRAM

## 2024-12-03 PROCEDURE — 3008F BODY MASS INDEX DOCD: CPT | Performed by: STUDENT IN AN ORGANIZED HEALTH CARE EDUCATION/TRAINING PROGRAM

## 2024-12-03 NOTE — PROGRESS NOTES
[facility]  Obstetrics and Gynecology  Follow Up Progress Note    Subjective:     Carol Krueger is a 49 year old  female who was last seen in office 2024 for PMDD and presents with c/o vaginitis. The patient reports new onset malodor with burning and itching of vagina over the weekend which she believes could be BV. She was recently diagnosed with a yeast infection for which she was treated with Diflucan. She had a prior BV infection earlier this summer which was treated with abx regimen to good effect. Pt stated symptoms of yeast infection went away after Diflucan but on Saturday she started to notice malodor again. She self-treated with boric acid on Saturday and , but called office to schedule visit because she wanted to make sure nothing else was wrong. Since boric acid suppository treatment she does endorse feeling better and denies malodor or itching/burning today. She is currently on her cycle.     Review of Systems:  General: no complaints per category. See HPI for additional information.   Breast: no complaints per category. See HPI for additional information.   Respiratory: no complaints per category. See HPI for additional information.   Cardiovascular: no complaints per category. See HPI for additional information.   GI: no complaints per category. See HPI for additional information.   : no complaints per category. See HPI for additional information.   Heme: no complaints per category. See HPI for additional information.     OB History    Para Term  AB Living   2 2       2   SAB IAB Ectopic Multiple Live Births           2      # Outcome Date GA Lbr Vivek/2nd Weight Sex Type Anes PTL Lv   2 Para 13    F Vag-Spont EPI  MARY   1 Para 10/23/11    M Vag-Spont EPI  MARY         Gyne History:  Menarche: 12  Period Cycle (Days): 28-30 days  Period Duration (Days): varies  Period Flow: light  Use of Birth Control (if yes, specify type): Vasectomy  Hx Prior  Abnormal Pap: No  Pap Date: 24  Pap Result Notes: wnl  Patient's last menstrual period was 2024 (exact date).      Meds:  Medications Ordered Prior to Encounter[1]    All:  Allergies[2]    PMH:  Past Medical History:    Abnormal uterine bleeding    Had endometrial ablation; no issues currently    Anemia    Hx of… not current    Anxiety    Stopped zoloft a couple weeks after i started Maggi because of extreme sadness    Depression    Stopped zoloft after starting maggi bc of extreme sadness whole on both at the same time    Fatigue    Hyperlipidemia    Night sweats    Wears glasses    Weight gain       PSH:  Past Surgical History:   Procedure Laterality Date    Endometrial ablation  2017    Implantable breast prosthesis  Oct 2015         Objective:     Vitals:    24 1707   BP: 108/78   Pulse: 74   Weight: 156 lb (70.8 kg)   Height: 64\"         Body mass index is 26.78 kg/m².    General: AAO.NAD.   CVS exam: normal peripheral perfusion  Chest: non-labored breathing, no tachypnea   Breast: defereed  Abdominal exam: soft, nontender, nondistended  Pelvic exam:   VULVA: normal appearing vulva with no masses, tenderness or lesions  PERINEUM:  normal appearing, no lesions   URETHRAL MEATUS:  normal appearing, no lesions   VAGINA: normal appearing vagina with normal color and discharge, no lesions; minimal blood in vaginal canal; no malodor appreciated  CERVIX: normal appearing cervix without discharge or lesions; blood tinged mucous at external os  UTERUS: uterus is normal size, shape, consistency and nontender  ADNEXA: normal adnexa in size, nontender and no masses  PERIRECTAL:  normal appearing, no lesions   Ext: non-tender, no edema    Labs:    Imaging:  N/a       Assessment:     Carol Krueger is a 49 year old  female who presents for vaginal malodor, concern for BV infection        Plan:     Problem List Items Addressed This Visit    None  Visit Diagnoses       Vaginitis and vulvovaginitis    -   Primary    Relevant Orders    Vaginitis Vaginosis PCR Panel              Vaginitis  - Pt with recent yeast infection s/p antifungal treatment  - New onset malodor with burning/itching on Saturday  - Pt self-treated with boric acid suppositories x2 days  - Physical exam unremarkable today  - Vaginitis panel collected; pt declined STI testing  - Will message with results and treat accordingly; pt in agreement    All of the findings and plan were discussed with the patient.  She notes understanding and agrees with the plan of care.  All questions were answered to the best of my ability at this time.      Total patient time was 20 minutes in evaluation, consultation, and coordination of care. This included face to face and non-face to face actions. The patient's questions and concerns were addressed.       RTC as needed or sooner if new or worsening symptoms    Paulina Frias DO   [facility] - O[facility]       Discussed with patient that there will not be further notification of normal or benign results other than receiving results on mychart. A mychart message or telephone call will be placed by the physician and/or office staff if results are abnormal.     Note to patient and family   The 21st Century Cures Act makes medical notes available to patients in the interest of transparency.  However, please be advised that this is a medical document.  It is intended as [id number] communication.  It is written and medical language may contain abbreviations or verbiage that are technical and unfamiliar.  It may appear blunt or direct.  Medical documents are intended to carry relevant information, facts as evident, and the clinical opinion of the practitioner.        This note could include assistance by Dragon voice recognition. Errors in content may be related to improper recognition by the system; efforts to review and correct have been done but errors may still exist.        [1]   Current Outpatient Medications on File Prior to  Visit   Medication Sig Dispense Refill    fluconazole (DIFLUCAN) 150 MG Oral Tab Take 1 tablet by mouth now. May repeat in 72 hrs if symptoms persist. Please call office if not resolved after 2 doses. 2 tablet 0    PEG 3350-KCl-Na Bicarb-NaCl 420 g Oral Recon Soln Take as directed by physician 4000 mL 0    Estradiol-Levonorgestrel (CLIMARA PRO) 0.045-0.015 MG/DAY Transdermal Patch Weekly Place 1 patch onto the skin once a week. 4 patch 13     No current facility-administered medications on file prior to visit.   [2] No Known Allergies

## 2024-12-04 ENCOUNTER — PATIENT MESSAGE (OUTPATIENT)
Dept: OBGYN CLINIC | Facility: CLINIC | Age: 49
End: 2024-12-04

## 2024-12-04 LAB
BV BACTERIA DNA VAG QL NAA+PROBE: POSITIVE
C GLABRATA DNA VAG QL NAA+PROBE: NEGATIVE
C KRUSEI DNA VAG QL NAA+PROBE: NEGATIVE
CANDIDA DNA VAG QL NAA+PROBE: NEGATIVE
T VAGINALIS DNA VAG QL NAA+PROBE: NEGATIVE

## 2024-12-05 RX ORDER — METRONIDAZOLE 500 MG/1
500 TABLET ORAL 2 TIMES DAILY
Qty: 14 TABLET | Refills: 0 | Status: SHIPPED | OUTPATIENT
Start: 2024-12-05 | End: 2024-12-12

## 2024-12-05 NOTE — TELEPHONE ENCOUNTER
Pt called asking about prescribing her an antibiotic due to her BV symptoms. Pt states she would like it as soon as possible to start on it right away. Please advise.

## 2025-01-06 ENCOUNTER — PATIENT MESSAGE (OUTPATIENT)
Dept: OBGYN CLINIC | Facility: CLINIC | Age: 50
End: 2025-01-06

## 2025-01-09 ENCOUNTER — PATIENT MESSAGE (OUTPATIENT)
Dept: OBGYN CLINIC | Facility: CLINIC | Age: 50
End: 2025-01-09

## 2025-01-10 NOTE — TELEPHONE ENCOUNTER
Paulina Frias DO to [facility] [place] Nurse    1/8/25 10:49 AM  I advised her to see Dr. Powers as he has experience with HRT.  Thanks,  Dr. Frias

## 2025-01-13 ENCOUNTER — LAB REQUISITION (OUTPATIENT)
Dept: LAB | Facility: HOSPITAL | Age: 50
End: 2025-01-13
Payer: COMMERCIAL

## 2025-01-13 DIAGNOSIS — D48.5 NEOPLASM OF UNCERTAIN BEHAVIOR OF SKIN: ICD-10-CM

## 2025-01-13 PROBLEM — Z12.11 SPECIAL SCREENING FOR MALIGNANT NEOPLASM OF COLON: Status: RESOLVED | Noted: 2024-10-03 | Resolved: 2025-01-13

## 2025-01-13 PROCEDURE — 88305 TISSUE EXAM BY PATHOLOGIST: CPT | Performed by: PLASTIC SURGERY

## 2025-01-13 RX ORDER — ESTRADIOL AND LEVONORGESTREL .045; .015 MG/D; MG/D
1 PATCH TRANSDERMAL WEEKLY
Refills: 0 | Status: CANCELLED
Start: 2025-01-13

## 2025-01-14 ENCOUNTER — OFFICE VISIT (OUTPATIENT)
Dept: OBGYN CLINIC | Facility: CLINIC | Age: 50
End: 2025-01-14
Payer: COMMERCIAL

## 2025-01-14 VITALS
SYSTOLIC BLOOD PRESSURE: 100 MMHG | HEIGHT: 64 IN | WEIGHT: 156 LBS | BODY MASS INDEX: 26.63 KG/M2 | DIASTOLIC BLOOD PRESSURE: 62 MMHG | HEART RATE: 92 BPM

## 2025-01-14 DIAGNOSIS — N95.9 MENOPAUSAL PROBLEM: Primary | ICD-10-CM

## 2025-01-14 PROCEDURE — 3008F BODY MASS INDEX DOCD: CPT | Performed by: OBSTETRICS & GYNECOLOGY

## 2025-01-14 PROCEDURE — 99214 OFFICE O/P EST MOD 30 MIN: CPT | Performed by: OBSTETRICS & GYNECOLOGY

## 2025-01-14 PROCEDURE — 3078F DIAST BP <80 MM HG: CPT | Performed by: OBSTETRICS & GYNECOLOGY

## 2025-01-14 PROCEDURE — 3074F SYST BP LT 130 MM HG: CPT | Performed by: OBSTETRICS & GYNECOLOGY

## 2025-01-14 RX ORDER — ESTRADIOL 0.05 MG/D
1 PATCH, EXTENDED RELEASE TRANSDERMAL
Qty: 24 PATCH | Refills: 1 | Status: SHIPPED | OUTPATIENT
Start: 2025-01-16

## 2025-01-14 RX ORDER — PROGESTERONE 200 MG/1
200 CAPSULE ORAL NIGHTLY
Qty: 90 CAPSULE | Refills: 1 | Status: SHIPPED | OUTPATIENT
Start: 2025-01-14

## 2025-01-14 NOTE — PROGRESS NOTES
Subjective:  49 year old    Chief Complaint   Patient presents with    Consult     Pt would like to discuss current HRT meds      Patient presents to discuss hormonal symptoms, including acne, night sweats, insomnia, facial hair growth, hair loss.  Most recently, patient has been taking ClimaraPro from August to December.  Patch was working well for about 3 months, but symptoms have returned.  Hx of bad PMDD, mostly anxiety and irritability.  Previously was on Liberty and SSRI but depression symptoms flared, so stopped the SSRI.  Was switched to Steph and then the patch.  Hx of endometrial ablation. Still getting very light monthly period, usually just a couple days of light bleeding, although she did have one menses recently which was more prolonged and heavy.  No intermenstrual bleeding.  Normal thyroid test in August.  Takes Minoxidil for the hair loss.    Review of Systems:  Pertinent items are noted in the HPI.    Objective:  /62   Pulse 92   Ht 64\"   Wt 156 lb (70.8 kg)   LMP 2025 (Exact Date)     Physical Examination:  General appearance: Well dressed, well nourished in no apparent distress  Neurologic/Psychiatric: Alert and oriented to person, place and time, mood normal, affect appropriate    Assessment/Plan:  Likely perimenopause with multiple hormonal symptoms and hx of PMDD.  Patient desires switch to Prometrium and vivelle.  Will increase estradiol from 0.045 to 0.05 mg and see if any improvement with change in dosage and change from Levonorgestrel to natural progesterone.  Discussed option of addition of an SSRI, either cyclical or continuous, but would prefer to avoid making multiple changes at once.  Recommend observe for 2-3 months, and then can add low dose SSRI if persistent mood symptoms, which can be a side effect of progesterone.    Diagnoses and all orders for this visit:    Menopausal problem  -     estradiol (VIVELLE-DOT) 0.05 MG/24HR Transdermal Patch Biweekly; Place 1  patch onto the skin twice a week.  -     progesterone (PROMETRIUM) 200 MG Oral Cap; Take 1 capsule (200 mg total) by mouth nightly.      Return if symptoms worsen or fail to improve, for Annual Well Woman Exam.    Total patient time was 30 minutes in reviewing paper/electronic records, reviewing test results from outside care provider, obtaining history, evaluating the patient, consultation, discussing treatment options, coordination of care and completing documentation. This included face to face and non-face to face actions. The patient's questions and concerns were addressed.

## 2025-01-18 ENCOUNTER — LAB ENCOUNTER (OUTPATIENT)
Dept: LAB | Age: 50
End: 2025-01-18
Attending: FAMILY MEDICINE
Payer: COMMERCIAL

## 2025-01-18 DIAGNOSIS — T45.2X1A VITAMIN D TOXICITY, ACCIDENTAL OR UNINTENTIONAL, INITIAL ENCOUNTER: ICD-10-CM

## 2025-01-18 LAB — VIT D+METAB SERPL-MCNC: 55.2 NG/ML (ref 30–100)

## 2025-01-18 PROCEDURE — 82306 VITAMIN D 25 HYDROXY: CPT

## 2025-01-18 PROCEDURE — 36415 COLL VENOUS BLD VENIPUNCTURE: CPT

## 2025-01-27 ENCOUNTER — LAB REQUISITION (OUTPATIENT)
Dept: LAB | Facility: HOSPITAL | Age: 50
End: 2025-01-27
Payer: COMMERCIAL

## 2025-01-27 DIAGNOSIS — D48.5 NEOPLASM OF UNCERTAIN BEHAVIOR OF SKIN: ICD-10-CM

## 2025-01-27 PROCEDURE — 88305 TISSUE EXAM BY PATHOLOGIST: CPT | Performed by: PLASTIC SURGERY

## 2025-02-16 ENCOUNTER — PATIENT MESSAGE (OUTPATIENT)
Dept: OBGYN CLINIC | Facility: CLINIC | Age: 50
End: 2025-02-16

## 2025-02-18 NOTE — TELEPHONE ENCOUNTER
1/14/2025- office visit to discuss HRT meds    Patient message received providing update on vasomotor symptoms.  HRT started on 8/30/2024.  Patient reports initial improvement, but no further improvement seen over last month.  Requesting to increase patch to .075 mg.  Routed to provider for review.

## 2025-02-25 PROBLEM — N95.9 MENOPAUSAL PROBLEM: Status: ACTIVE | Noted: 2025-02-25

## 2025-03-09 DIAGNOSIS — N95.9 MENOPAUSAL PROBLEM: ICD-10-CM

## 2025-03-11 ENCOUNTER — PATIENT MESSAGE (OUTPATIENT)
Dept: OBGYN CLINIC | Facility: CLINIC | Age: 50
End: 2025-03-11

## 2025-03-11 RX ORDER — ESTRADIOL 0.07 MG/D
1 FILM, EXTENDED RELEASE TRANSDERMAL
Qty: 24 PATCH | Refills: 0 | OUTPATIENT
Start: 2025-03-11

## 2025-03-11 NOTE — TELEPHONE ENCOUNTER
Last refill date:   Medication Quantity Refills Start End   estradiol (VIVELLE-DOT) 0.075 MG/24HR Transdermal Patch Biweekly 24 patch 0 2/27/2025 --   Sig:   Place 1 patch onto the skin twice a week.       Next appt.: none scheduled    Refill denied per protocol.

## 2025-03-15 ENCOUNTER — HOSPITAL ENCOUNTER (OUTPATIENT)
Dept: CT IMAGING | Age: 50
Discharge: HOME | End: 2025-03-15
Attending: FAMILY MEDICINE

## 2025-03-15 DIAGNOSIS — Z13.6 SCREENING FOR HEART DISEASE: ICD-10-CM

## 2025-04-08 ENCOUNTER — OFFICE VISIT (OUTPATIENT)
Dept: OBGYN CLINIC | Facility: CLINIC | Age: 50
End: 2025-04-08
Payer: COMMERCIAL

## 2025-04-08 VITALS
BODY MASS INDEX: 27.14 KG/M2 | HEART RATE: 96 BPM | HEIGHT: 64 IN | SYSTOLIC BLOOD PRESSURE: 110 MMHG | WEIGHT: 159 LBS | DIASTOLIC BLOOD PRESSURE: 64 MMHG

## 2025-04-08 DIAGNOSIS — Z01.419 WELL WOMAN EXAM WITH ROUTINE GYNECOLOGICAL EXAM: ICD-10-CM

## 2025-04-08 DIAGNOSIS — Z12.31 ENCOUNTER FOR SCREENING MAMMOGRAM FOR MALIGNANT NEOPLASM OF BREAST: Primary | ICD-10-CM

## 2025-04-08 DIAGNOSIS — N95.9 MENOPAUSAL PROBLEM: ICD-10-CM

## 2025-04-08 PROCEDURE — 3078F DIAST BP <80 MM HG: CPT | Performed by: STUDENT IN AN ORGANIZED HEALTH CARE EDUCATION/TRAINING PROGRAM

## 2025-04-08 PROCEDURE — 3074F SYST BP LT 130 MM HG: CPT | Performed by: STUDENT IN AN ORGANIZED HEALTH CARE EDUCATION/TRAINING PROGRAM

## 2025-04-08 PROCEDURE — 99396 PREV VISIT EST AGE 40-64: CPT | Performed by: STUDENT IN AN ORGANIZED HEALTH CARE EDUCATION/TRAINING PROGRAM

## 2025-04-08 PROCEDURE — 3008F BODY MASS INDEX DOCD: CPT | Performed by: STUDENT IN AN ORGANIZED HEALTH CARE EDUCATION/TRAINING PROGRAM

## 2025-04-08 RX ORDER — ESTRADIOL 0.07 MG/D
1 FILM, EXTENDED RELEASE TRANSDERMAL
Qty: 24 PATCH | Refills: 5 | Status: SHIPPED | OUTPATIENT
Start: 2025-04-10

## 2025-04-08 RX ORDER — PROGESTERONE 200 MG/1
200 CAPSULE ORAL NIGHTLY
Qty: 90 CAPSULE | Refills: 5 | Status: SHIPPED | OUTPATIENT
Start: 2025-04-08

## 2025-04-30 ENCOUNTER — HOSPITAL ENCOUNTER (OUTPATIENT)
Dept: MAMMOGRAPHY | Age: 50
Discharge: HOME | End: 2025-04-30
Attending: STUDENT IN AN ORGANIZED HEALTH CARE EDUCATION/TRAINING PROGRAM
Payer: COMMERCIAL

## 2025-04-30 DIAGNOSIS — Z12.31 ENCOUNTER FOR SCREENING MAMMOGRAM FOR MALIGNANT NEOPLASM OF BREAST: ICD-10-CM

## 2025-04-30 PROCEDURE — 77067 SCR MAMMO BI INCL CAD: CPT | Performed by: STUDENT IN AN ORGANIZED HEALTH CARE EDUCATION/TRAINING PROGRAM

## 2025-04-30 PROCEDURE — 77063 BREAST TOMOSYNTHESIS BI: CPT | Performed by: STUDENT IN AN ORGANIZED HEALTH CARE EDUCATION/TRAINING PROGRAM

## 2025-05-07 ENCOUNTER — PATIENT MESSAGE (OUTPATIENT)
Dept: OBGYN CLINIC | Facility: CLINIC | Age: 50
End: 2025-05-07

## 2025-05-09 RX ORDER — METRONIDAZOLE 500 MG/1
500 TABLET ORAL 2 TIMES DAILY
Qty: 14 TABLET | Refills: 0 | Status: SHIPPED | OUTPATIENT
Start: 2025-05-09 | End: 2025-05-16

## 2025-05-09 NOTE — TELEPHONE ENCOUNTER
12/3/2024- BV positive, Flagyl ordered    Order pended for provider review-  See patient message  Unable to schedule office visit as family member is hospitalized  Please approve/deny pended order